# Patient Record
Sex: FEMALE | Race: WHITE | NOT HISPANIC OR LATINO | Employment: OTHER | URBAN - NONMETROPOLITAN AREA
[De-identification: names, ages, dates, MRNs, and addresses within clinical notes are randomized per-mention and may not be internally consistent; named-entity substitution may affect disease eponyms.]

---

## 2023-03-22 PROBLEM — E66.811 CLASS 1 OBESITY WITH BODY MASS INDEX (BMI) OF 32.0 TO 32.9 IN ADULT: Status: ACTIVE | Noted: 2023-03-22

## 2023-03-22 PROBLEM — E03.9 HYPOTHYROIDISM: Status: ACTIVE | Noted: 2023-03-22

## 2023-03-22 PROBLEM — E78.00 ELEVATED LDL CHOLESTEROL LEVEL: Status: ACTIVE | Noted: 2023-03-22

## 2023-03-22 PROBLEM — R03.0 ELEVATED BLOOD PRESSURE READING WITHOUT DIAGNOSIS OF HYPERTENSION: Status: ACTIVE | Noted: 2023-03-22

## 2023-03-22 PROBLEM — R01.1 HEART MURMUR: Status: ACTIVE | Noted: 2023-03-22

## 2023-03-22 PROBLEM — E66.9 CLASS 1 OBESITY WITH BODY MASS INDEX (BMI) OF 32.0 TO 32.9 IN ADULT: Status: ACTIVE | Noted: 2023-03-22

## 2023-03-22 RX ORDER — LEVOTHYROXINE SODIUM 112 UG/1
1 TABLET ORAL DAILY
COMMUNITY
End: 2024-01-08 | Stop reason: SDUPTHER

## 2023-03-23 ENCOUNTER — OFFICE VISIT (OUTPATIENT)
Dept: PRIMARY CARE | Facility: CLINIC | Age: 63
End: 2023-03-23
Payer: COMMERCIAL

## 2023-03-23 VITALS
OXYGEN SATURATION: 97 % | WEIGHT: 190.3 LBS | HEART RATE: 120 BPM | TEMPERATURE: 97.1 F | RESPIRATION RATE: 14 BRPM | HEIGHT: 67 IN | BODY MASS INDEX: 29.87 KG/M2 | SYSTOLIC BLOOD PRESSURE: 156 MMHG | DIASTOLIC BLOOD PRESSURE: 80 MMHG

## 2023-03-23 DIAGNOSIS — Z00.00 HEALTHCARE MAINTENANCE: ICD-10-CM

## 2023-03-23 DIAGNOSIS — E03.9 HYPOTHYROIDISM, UNSPECIFIED TYPE: ICD-10-CM

## 2023-03-23 DIAGNOSIS — H10.33 ACUTE CONJUNCTIVITIS OF BOTH EYES, UNSPECIFIED ACUTE CONJUNCTIVITIS TYPE: Primary | ICD-10-CM

## 2023-03-23 DIAGNOSIS — R03.0 ELEVATED BLOOD PRESSURE READING WITHOUT DIAGNOSIS OF HYPERTENSION: ICD-10-CM

## 2023-03-23 PROBLEM — E66.811 CLASS 1 OBESITY WITH BODY MASS INDEX (BMI) OF 32.0 TO 32.9 IN ADULT: Status: RESOLVED | Noted: 2023-03-22 | Resolved: 2023-03-23

## 2023-03-23 PROBLEM — E66.9 CLASS 1 OBESITY WITH BODY MASS INDEX (BMI) OF 32.0 TO 32.9 IN ADULT: Status: RESOLVED | Noted: 2023-03-22 | Resolved: 2023-03-23

## 2023-03-23 PROCEDURE — 99213 OFFICE O/P EST LOW 20 MIN: CPT | Performed by: FAMILY MEDICINE

## 2023-03-23 PROCEDURE — 1036F TOBACCO NON-USER: CPT | Performed by: FAMILY MEDICINE

## 2023-03-23 RX ORDER — NEOMYCIN AND POLYMYXIN B SULFATES AND BACITRACIN ZINC 400; 3.5; 1 [USP'U]/G; MG/G; [USP'U]/G
0.5 OINTMENT OPHTHALMIC 4 TIMES DAILY
Qty: 3.5 G | Refills: 1 | Status: SHIPPED | OUTPATIENT
Start: 2023-03-23 | End: 2023-04-02

## 2023-03-23 ASSESSMENT — ENCOUNTER SYMPTOMS
CHILLS: 0
FEVER: 0
WHEEZING: 0
RHINORRHEA: 0
WEIGHT LOSS: 0
PALPITATIONS: 0
SWEATS: 0
MYALGIAS: 0
HEMOPTYSIS: 0
SORE THROAT: 0
HEADACHES: 0
HEARTBURN: 0
SHORTNESS OF BREATH: 0
COUGH: 1

## 2023-03-23 NOTE — PROGRESS NOTES
"Subjective   Patient ID: Ofelia Morton is a 62 y.o. female who presents for URI (Cough,reddened eyes/eye drainage for appox 9 days did have low grade fever and chills the first day).    Cough  This is a new problem. Episode onset: 9 days ago. The problem has been gradually worsening. The problem occurs every few minutes. The cough is Non-productive. Associated symptoms include ear congestion. Pertinent negatives include no chest pain, chills, fever, heartburn, hemoptysis, myalgias, nasal congestion, postnasal drip, rash, shortness of breath, sweats or weight loss. Nothing aggravates the symptoms.     Her symptoms started after [place] zoo. She has been using warm compresses to eyes, conjunctival are injected. Now having gunky discharge from eyes, has to every 5-6 hours.    Fever initially over a week ago, none recent.  Took Tylenol for this.  Felt unwell until a few days ago.   Feeling better today, more so than yesterday.    She has congestion but is unable to blow anything out of her nose. No colored secretions. No headaches.  Ear pain transiently for about 2 hours, none since.  Main complaints are of eye discharge and eye redness.    No chest pain, SOB, palpitations.  No myalgias, swollen glands, rash.  No change in vision, no eye pain, no pain with extra occular movements.    Review of Systems   Constitutional:  Negative for chills, fever and weight loss.   HENT:  Positive for congestion. Negative for postnasal drip.    Respiratory:  Negative for hemoptysis and shortness of breath.    Cardiovascular:  Negative for chest pain and palpitations.   Gastrointestinal:  Negative for heartburn.   Musculoskeletal:  Negative for myalgias.   Skin:  Negative for rash.       Objective   /80 (BP Location: Left arm, Patient Position: Sitting, BP Cuff Size: Adult)   Pulse (!) 120   Temp 36.2 °C (97.1 °F) (Temporal)   Resp 14   Ht 1.689 m (5' 6.5\")   Wt 86.3 kg (190 lb 4.8 oz)   SpO2 97%   BMI 30.26 kg/m² "     Physical Exam  Gen: Patient appears non-toxic and in NAD  Skin: No rashes  Head/eyes: MEE, EOMI, significant conjunctival injection bilaterally. Bulbar and palpebral conjunctiva bilaterally.  Ears: Right TM with air fluid level positive, non-purulent. Left TM normal. Right TM slightly more retracted than left, no bulging or erythema to either.  Nose: MMP, clear post-nasal drainage.  Throat: MMP with no exudates, uvula midline  Neck: No cervical lymphadenopathy, no masses, no JVD  CV: HRRR no MCG  Lungs: CTA no wheeze, rhonchi, rales  Extremities: no pretibial edema    Assessment/Plan   Diagnoses and all orders for this visit:  Acute conjunctivitis of both eyes, unspecified acute conjunctivitis type  -     neomycin-bacitracin-polymyxin (Polysporin) ophthalmic ointment; Apply 0.5 inches to both eyes in the morning and 0.5 inches at noon and 0.5 inches in the evening and 0.5 inches before bedtime. Do all this for 10 days.  Elevated blood pressure reading without diagnosis of hypertension  Hypothyroidism, unspecified type  -     TSH with reflex to Free T4 if abnormal; Future  Healthcare maintenance  -     CBC; Future  -     Comprehensive Metabolic Panel; Future  -     Lipid Panel; Future  -     Follow Up In [facility] - PCP; Future    Patient with primarily with eye complaints, no recent fever or colored sputum. Likely viral, reviewed with patient, however, due to duration will give eye drop.    POLYSPORIN eye drops sent to pharmacy, proper use discussed with patient.    Routine blood work ordered for physical today.  Will recheck BP and further discuss at follow-up.    Follow-up in 3 months for recheck blood pressure and annual physical. Blood work to be done prior.  Call for sooner follow-up if needed.        Scribe Attestation  By signing my name below, Renee ROBIN Scribe   attest that this documentation has been prepared under the direction and in the presence of Sally Marley DO.

## 2023-03-23 NOTE — PATIENT INSTRUCTIONS
EYE DROPS SENT TO YOUR PHARMACY  USE EYE DROPS AS DISCUSSED UNTIL SYMPTOMS CLEAR THEN FOR 1-2 ADDITIONAL DAYS AFTER    Blood work for physical ordered today, having this done FASTING at your earliest convenience.  Follow-up in 3 months for routine visit plus annual physical.   patient/guardian

## 2023-07-25 ENCOUNTER — APPOINTMENT (OUTPATIENT)
Dept: PRIMARY CARE | Facility: CLINIC | Age: 63
End: 2023-07-25
Payer: COMMERCIAL

## 2023-11-13 ENCOUNTER — PATIENT OUTREACH (OUTPATIENT)
Dept: CARE COORDINATION | Facility: CLINIC | Age: 63
End: 2023-11-13
Payer: COMMERCIAL

## 2023-11-13 ENCOUNTER — DOCUMENTATION (OUTPATIENT)
Dept: PRIMARY CARE | Facility: CLINIC | Age: 63
End: 2023-11-13
Payer: COMMERCIAL

## 2023-11-13 RX ORDER — DAPAGLIFLOZIN 10 MG/1
10 TABLET, FILM COATED ORAL
COMMUNITY
Start: 2023-11-11 | End: 2024-02-09

## 2023-11-13 RX ORDER — METOPROLOL SUCCINATE 25 MG/1
25 TABLET, EXTENDED RELEASE ORAL
COMMUNITY
Start: 2023-11-10 | End: 2024-03-09

## 2023-11-13 RX ORDER — ASPIRIN 81 MG/1
81 TABLET ORAL
COMMUNITY
Start: 2023-11-11 | End: 2024-11-05

## 2023-11-13 RX ORDER — NITROGLYCERIN 0.4 MG/1
0.4 TABLET SUBLINGUAL
COMMUNITY
Start: 2023-11-10 | End: 2023-12-10

## 2023-11-13 RX ORDER — ROSUVASTATIN CALCIUM 40 MG/1
1 TABLET, COATED ORAL NIGHTLY
COMMUNITY
Start: 2023-11-10 | End: 2024-03-09

## 2023-11-13 NOTE — PROGRESS NOTES
Discharge Facility: [facility]  Discharge Diagnosis: STEMI  Admission Date: 11/9/23  Discharge Date: 11/10/23    PCP Appointment Date: 11/21/23  Specialist Appointment Date: TBD cardiology  Hospital Encounter and Summary: Linked   See discharge assessment below for further details    Engagement  Call Start Time: 1215 (11/13/2023 12:38 PM)    Medications  Medications reviewed with patient/caregiver?: Yes (11/13/2023 12:38 PM)  Is the patient having any side effects they believe may be caused by any medication additions or changes?: No (11/13/2023 12:38 PM)  Does the patient have all medications ordered at discharge?: Yes (11/13/2023 12:38 PM)  Care Management Interventions: Provided patient education (11/13/2023 12:38 PM)  Prescription Comments: on aspirin, brilinta, crestor, metoprolol, farxiga, nitro as needed (11/13/2023 12:38 PM)  Is the patient taking all medications as directed (includes completed medication regime)?: Yes (11/13/2023 12:38 PM)  Care Management Interventions: Provided patient education (11/13/2023 12:38 PM)  Medication Comments: No issues obtaining or affording medication (11/13/2023 12:38 PM)    Appointments  Does the patient have a primary care provider?: Yes (11/13/2023 12:38 PM)  Care Management Interventions: Verified appointment date/time/provider (11/13/2023 12:38 PM)  Has the patient kept scheduled appointments due by today?: Yes (11/13/2023 12:38 PM)  Care Management Interventions: Advised to schedule with specialist (11/13/2023 12:38 PM)    Patient Teaching  Does the patient have access to their discharge instructions?: Yes (11/13/2023 12:38 PM)  Care Management Interventions: Reviewed instructions with patient (11/13/2023 12:38 PM)  What is the patient's perception of their health status since discharge?: Improving (11/13/2023 12:38 PM)  Is the patient/caregiver able to teach back the hierarchy of who to call/visit for symptoms/problems? PCP, Specialist, Home Health nurse, Urgent Care,  "ED, 911: Yes (11/13/2023 12:38 PM)  Patient/Caregiver Education Comments: Aware to seek care with any worsening and prolonged chest pain, weigh self daily and record, adhere to low sodium heart healthy diet, compliance with medications. (11/13/2023 12:38 PM)    Wrap Up  Wrap Up Additional Comments: She is doing well, reported a couple instances of \"twinge\" sensation in her chest, resolved immediately, no pain. Advised possibly due to stent placement and settling into vessle, spasms sometimes occur but to notify cardiology of any increase in instances or pain. (11/13/2023 12:38 PM)  Call End Time: 1225 (11/13/2023 12:38 PM)        " Toxic myopathy

## 2023-11-21 ENCOUNTER — OFFICE VISIT (OUTPATIENT)
Dept: PRIMARY CARE | Facility: CLINIC | Age: 63
End: 2023-11-21
Payer: COMMERCIAL

## 2023-11-21 VITALS
HEART RATE: 99 BPM | DIASTOLIC BLOOD PRESSURE: 81 MMHG | WEIGHT: 180.8 LBS | SYSTOLIC BLOOD PRESSURE: 131 MMHG | TEMPERATURE: 98.8 F | OXYGEN SATURATION: 94 % | BODY MASS INDEX: 28.74 KG/M2 | RESPIRATION RATE: 14 BRPM

## 2023-11-21 DIAGNOSIS — I50.20 HFREF (HEART FAILURE WITH REDUCED EJECTION FRACTION) (MULTI): ICD-10-CM

## 2023-11-21 DIAGNOSIS — I10 PRIMARY HYPERTENSION: ICD-10-CM

## 2023-11-21 DIAGNOSIS — Z09 HOSPITAL DISCHARGE FOLLOW-UP: ICD-10-CM

## 2023-11-21 DIAGNOSIS — E78.49 OTHER HYPERLIPIDEMIA: ICD-10-CM

## 2023-11-21 DIAGNOSIS — I21.4 NSTEMI (NON-ST ELEVATED MYOCARDIAL INFARCTION) (MULTI): Primary | ICD-10-CM

## 2023-11-21 PROCEDURE — 3079F DIAST BP 80-89 MM HG: CPT | Performed by: FAMILY MEDICINE

## 2023-11-21 PROCEDURE — 99495 TRANSJ CARE MGMT MOD F2F 14D: CPT | Performed by: FAMILY MEDICINE

## 2023-11-21 PROCEDURE — 1036F TOBACCO NON-USER: CPT | Performed by: FAMILY MEDICINE

## 2023-11-21 PROCEDURE — 3075F SYST BP GE 130 - 139MM HG: CPT | Performed by: FAMILY MEDICINE

## 2023-11-21 NOTE — ASSESSMENT & PLAN NOTE
Ef 40 -45% after mi     On GD therapy   minus ARB,   bp too low to start   will defer addition valsartan to cardio     Recent stent now stable no sx, follows with cardio

## 2023-11-21 NOTE — ASSESSMENT & PLAN NOTE
Lad stent [facility] [place] nov 2023    Gdt started     Ef 40 -45% after mi     On GD therapy   minus ARB,   bp too low to start   will defer addition valsartan to cardio     Recent stent now stable no sx, follows with cardio

## 2023-11-21 NOTE — PROGRESS NOTES
Subjective   Patient ID: Ofelia Morton is a 63 y.o. female who presents for Hospital Follow-up (Pt presents for follow up hospital- STEMI, pt states that she is feeling great. Pt states no other concerns at this time. ) and Flu Vaccine (Pt declines flu vaccine at this time. ).    HPI     Patient is here for a hospital follow-up.  She was admitted to [facility] with an NSTEMI.  LAD was stented.  EF was found to be between 40 and 45%.    Patient does have follow-up upcoming with cardiology [facility] in December.  They will start her on cardiac rehab at that time.    Since discharge, patient has had no chest pain, palpitations, shortness of breath, nor has she noticed a difference in her exercise capacity.    Patient is not pushing exercise at this point, but has walked around and has even done a couple of short walks with the dog and has not found herself to be short of breath.    Leading up to her admission she had significant chest pressure and emesis x2 which led her to her ER evaluation.    Patient met with a nutritionist, and has already made changes to her lifestyle.    Patient is using this as a wake-up call.    She is taking and tolerating her medications.  She has not noticed a significant amount of increased urination with the fark Lucia.  Farxiga was started for heart failure, not for diabetes.    Patient is in the lowest prediabetic range by lab at her most recent admission.    She denies any yeast infection symptoms.    She does admit to a slight cough, but no other symptoms.    She has a good support system in her .    She is able to tolerate and afford her medications.    She does note that she has sort of a hollow feeling around her heart, not sure if that is because of her increased perfusion and lack of strain of her heart, or if it is a side effect of one of the medications such as Brilinta.    Patient does not have a blood pressure cuff, but plans to purchase  1.    Review of Systems    Objective   /81 (BP Location: Left arm, Patient Position: Sitting, BP Cuff Size: Large adult)   Pulse 99   Temp 37.1 °C (98.8 °F) (Temporal)   Resp 14   Wt 82 kg (180 lb 12.8 oz)   SpO2 94%   BMI 28.74 kg/m²     Physical Exam  Constitutional:       General: She is not in acute distress.     Appearance: Normal appearance.   Cardiovascular:      Rate and Rhythm: Normal rate and regular rhythm.      Heart sounds: Normal heart sounds.      Comments: Trace pretib edema bilaterally     No jvd  no hjr  Pulmonary:      Effort: Pulmonary effort is normal.      Breath sounds: Normal breath sounds. No wheezing, rhonchi or rales.   Abdominal:      Palpations: Abdomen is soft.      Tenderness: There is no abdominal tenderness. There is no guarding or rebound.   Neurological:      Mental Status: She is alert.   Psychiatric:         Mood and Affect: Mood normal.         Assessment/Plan   Problem List Items Addressed This Visit             ICD-10-CM    Primary hypertension I10     On bbl s/p mi     Will add arb if bp allows for hf tx    deferred today         NSTEMI (non-ST elevated myocardial infarction) (CMS/[facility]) - Primary I21.4     Lad stent [facility] [place] nov 2023    Gdt started     Ef 40 -45% after mi     On GD therapy   minus ARB,   bp too low to start   will defer addition valsartan to cardio     Recent stent now stable no sx, follows with cardio          Other hyperlipidemia E78.49     On statin         HFrEF (heart failure with reduced ejection fraction) (CMS/[facility]) I50.20     Ef 40 -45% after mi     On GD therapy   minus ARB,   bp too low to start   will defer addition valsartan to cardio     Recent stent now stable no sx, follows with cardio          Hospital discharge follow-up Z09     See notes, doing well after nstemi,  stenting lad           Follow up in 6 mo w me  recheck bp, lifestyle changes, thyroid

## 2023-11-28 ENCOUNTER — PATIENT OUTREACH (OUTPATIENT)
Dept: CARE COORDINATION | Facility: CLINIC | Age: 63
End: 2023-11-28
Payer: COMMERCIAL

## 2023-12-28 ENCOUNTER — PATIENT OUTREACH (OUTPATIENT)
Dept: CARE COORDINATION | Facility: CLINIC | Age: 63
End: 2023-12-28
Payer: COMMERCIAL

## 2024-01-08 DIAGNOSIS — E03.9 HYPOTHYROIDISM, UNSPECIFIED TYPE: ICD-10-CM

## 2024-01-08 RX ORDER — LEVOTHYROXINE SODIUM 112 UG/1
112 TABLET ORAL DAILY
Qty: 90 TABLET | Refills: 3 | Status: SHIPPED | OUTPATIENT
Start: 2024-01-08

## 2024-02-01 ENCOUNTER — PATIENT OUTREACH (OUTPATIENT)
Dept: CARE COORDINATION | Facility: CLINIC | Age: 64
End: 2024-02-01
Payer: COMMERCIAL

## 2024-05-21 ENCOUNTER — APPOINTMENT (OUTPATIENT)
Dept: PRIMARY CARE | Facility: CLINIC | Age: 64
End: 2024-05-21

## 2024-07-10 ENCOUNTER — OFFICE VISIT (OUTPATIENT)
Dept: PRIMARY CARE | Facility: CLINIC | Age: 64
End: 2024-07-10
Payer: COMMERCIAL

## 2024-07-10 VITALS
TEMPERATURE: 97.9 F | OXYGEN SATURATION: 96 % | SYSTOLIC BLOOD PRESSURE: 143 MMHG | BODY MASS INDEX: 28.68 KG/M2 | DIASTOLIC BLOOD PRESSURE: 78 MMHG | WEIGHT: 180.4 LBS | RESPIRATION RATE: 14 BRPM | HEART RATE: 90 BPM

## 2024-07-10 DIAGNOSIS — R35.0 URINARY FREQUENCY: ICD-10-CM

## 2024-07-10 DIAGNOSIS — R30.0 DYSURIA: Primary | ICD-10-CM

## 2024-07-10 PROCEDURE — 87086 URINE CULTURE/COLONY COUNT: CPT

## 2024-07-10 PROCEDURE — 3077F SYST BP >= 140 MM HG: CPT | Performed by: FAMILY MEDICINE

## 2024-07-10 PROCEDURE — 3078F DIAST BP <80 MM HG: CPT | Performed by: FAMILY MEDICINE

## 2024-07-10 PROCEDURE — 81001 URINALYSIS AUTO W/SCOPE: CPT

## 2024-07-10 PROCEDURE — 99214 OFFICE O/P EST MOD 30 MIN: CPT | Performed by: FAMILY MEDICINE

## 2024-07-10 PROCEDURE — 1036F TOBACCO NON-USER: CPT | Performed by: FAMILY MEDICINE

## 2024-07-10 RX ORDER — LISINOPRIL 5 MG/1
5 TABLET ORAL
COMMUNITY
Start: 2023-12-04

## 2024-07-10 RX ORDER — DAPAGLIFLOZIN 10 MG/1
1 TABLET, FILM COATED ORAL
COMMUNITY
Start: 2024-05-28

## 2024-07-10 RX ORDER — NITROFURANTOIN 25; 75 MG/1; MG/1
100 CAPSULE ORAL 2 TIMES DAILY
Qty: 14 CAPSULE | Refills: 0 | Status: SHIPPED | OUTPATIENT
Start: 2024-07-10 | End: 2024-07-17

## 2024-07-10 RX ORDER — ROSUVASTATIN CALCIUM 40 MG/1
40 TABLET, COATED ORAL NIGHTLY
COMMUNITY
Start: 2024-05-28

## 2024-07-10 NOTE — PROGRESS NOTES
Subjective        Ofelia Morton is a 63 y.o. female who presents for      HPI:    Dr Marley pt       Chief Complaint   Patient presents with    Blood in Urine     Last night + urinary urgency, mild abdominal pain and chills  No hematuria  observed since 10 am today.  Pt denies fever and flank pain   Pt has not taken anything to alleviate sxs, nothing exacerbates sxs  Pt has not been treated elsewhere for these sxs     Pt does deny any vaginal bleeding - although this was on the note line when she scheduled     Who is GYN? None      When was last pap? Unknown        What is pt taking ASA for? Diagnosis?  Myocardial infarction 11/09/2023-heart cath and stent    Is pt taking Brilinta also? Yes    Small amount blood on TP and some in toilet bowl last night when urinated, none since           4/4/24- H/H=14.5/46.1      No history kidney stone    Not dizzy or lightheaded     Social History     Tobacco Use   Smoking Status Never    Passive exposure: Never   Smokeless Tobacco Never         Review of Systems:        Objective        Vitals:    07/10/24 1417   BP: 143/78   BP Location: Left arm   Patient Position: Sitting   BP Cuff Size: Adult   Pulse: 90   Resp: 14   Temp: 36.6 °C (97.9 °F)   SpO2: 96%   Weight: 81.8 kg (180 lb 6.4 oz)       Pt is A and O x3, NAD, nontoxic, well-hydrated   Head- normocephalic and atraumatic,   EYES- conjunctiva- normal   lids- normal  EARS/NOSE- normal external exam   CV- RRR without murmur  PULM- CTA bilaterally, normal respiratory effort  RESPIRATORY EFFORT- normal , no retractions or nasal flaring   ABD- normoactive BS's , soft, no HSM, no CVAT, NT   EXT- no edema,NT  SKIN- no abnormal skin lesions noted  NEURO- no focal deficits  PSYCH- pleasant, normal judgement and insight                    BP Readings from Last 3 Encounters:   07/10/24 143/78   11/21/23 131/81   03/23/23 156/80           Wt Readings from Last 3 Encounters:   07/10/24 81.8 kg (180 lb 6.4 oz)   11/21/23 82 kg  (180 lb 12.8 oz)   03/23/23 86.3 kg (190 lb 4.8 oz)         BMI Readings from Last 3 Encounters:   07/10/24 28.68 kg/m²   11/21/23 28.74 kg/m²   03/23/23 30.26 kg/m²     The number and complexity of problems addressed is considered moderate.  The amount and/or complexity of data reviewed and analyzed is considered moderate. The risk of complications and/or morbidity/mortality of patient is considered moderate. Overall, this patient encounter is considered a moderate risk visit.      What are antibiotics?  Antibiotics are medicines that help people fight infections caused by bacteria. They work by killing bacteria that are in the body. These medicines come in many different forms, including pills, ointments, and liquids that are given by injection.    Antibiotics can do a lot of good. For people with serious bacterial infections, antibiotics can save lives. But people use them far too often, even when they're not needed. This is causing a very serious problem called antibiotic resistance. Antibiotic resistance happens when bacteria that have been exposed to an antibiotic change so that the antibiotic can no longer kill them. In those bacteria, the antibiotic has no effect. Because of this problem, doctors are having a harder and harder time treating infections. Experts worry that there will soon be infections that don't respond to any antibiotics.    When are antibiotics helpful?  Antibiotics can help people fight off infections caused by bacteria. They do not work on infections caused by viruses.    Some common bacterial infections that are treated with antibiotics include:    Strep throat    Pneumonia (an infection of the lungs)    Bladder infections    Infections you catch through sex, such as gonorrhea and chlamydia    When are antibiotics NOT helpful?    Antibiotics do not work on infections caused by viruses.    Antibiotics are not helpful for the common cold, because the common cold is caused by a  virus.    Antibiotics are not helpful for the flu, because the flu is caused by a virus. (People with the flu can be treated with medicines called antiviral medicines, which are different from antibiotics.)      Even though antibiotics don't work on infections caused by viruses, people sometimes believe that they do. That's because they took antibiotics for a viral infection before and then got better. The problem is that those people would have gotten better with or without an antibiotic. When they get better with the antibiotic, they think that's what cured them, when in reality the antibiotic had nothing to do with it.    What's the harm in taking antibiotics even if they might not help?  There are many reasons you should not take antibiotics unless you absolutely need them:    Antibiotics cause side effects, such as nausea, vomiting, and diarrhea. They can even make it more likely that you will get a different kind of infection, such as yeast infection (if you are a woman).    Allergies to antibiotics are common. You can develop an allergy to an antibiotic, even if you have not had a problem with it before. Some allergies are just unpleasant, causing rashes and itching. But some can be very serious and even life-threatening. It is better to avoid any risk of an allergy, if the antibiotic wouldn't help you anyway.    Overuse of antibiotics leads to antibiotic resistance. Using antibiotics when they are not needed gives bacteria a chance to change, so that the antibiotics cannot hurt them later on. People who have infections caused by antibiotic-resistant bacteria often have to be treated in the hospital with many different antibiotics. People can even die from these infections, because there is no antibiotic that will cure them.    When should I take antibiotics?  You should take antibiotics only when a doctor or nurse prescribes them to you. You should never take antibiotics prescribed to someone else, and you  "should not take antibiotics that were prescribed to you for a previous illness. When prescribing an antibiotic, doctors and nurses have to carefully pick the right antibiotic for a particular infection. Not all antibiotics work on all bacteria.    If you do have an infection caused by a bacteria, your doctor or nurse might want to find out what that bacteria is, and which antibiotics can kill it. They do this by taking a \"culture\" of the bacteria and growing it in the lab. But it's not possible to do a culture on someone who has already started an antibiotic. So if you start an antibiotic without input from a doctor or nurse it will be impossible to know if you have taken the right one.    What can I do to reduce antibiotic resistance?  Here are some things that can help:    Do not pressure your doctor or nurse for antibiotics when they do not think you need them.    If you are prescribed antibiotics, finish all of the medicine and take it exactly as directed. Never skip doses or stop taking the medicine without talking to your doctor or nurse.    Do not give antibiotics that were prescribed to you to anyone else.    What if my doctor prescribes an antibiotic that did not work for me before?  If an antibiotic did not work for you before, that does not mean it will never work for you. If you have used an antibiotic before and it did not work, tell your doctor. But keep in mind that the infection you had before might not have been caused by the same bacteria that you have now. The \"best\" antibiotic is the right one for the bacteria causing the infection, not for the person with the infection.    What if I am allergic to an antibiotic?  If you had a bad reaction to an antibiotic, tell your doctor or nurse about it. But do not assume you are allergic unless your doctor or nurse tells you that you are.    Many people who think they are allergic to an antibiotic are wrong. If you get nauseous after taking an antibiotic, " that does not mean you are allergic to it. Nausea is a common side effect of many antibiotics. If you are a woman and you get a yeast infection after taking an antibiotic, that does not mean you are allergic to it. Yeast infections are a common side effect of antibiotics.    Symptoms of antibiotic allergy can be mild and include a flat rash and itching. They can also be more serious and include:    Hives - Hives are raised, red patches of skin that are usually very itchy (picture 1).    Lip or tongue swelling    Trouble swallowing or breathing    Serious allergy symptoms can start right after you start taking an antibiotic if you are very sensitive. Less serious symptoms, on the other hand, often start a day or more later.    Almost all antibiotics may cause possible side effects of allergic reaction, nausea, vomiting, diarrhea- most worrisome caused by C. diff, and secondary yeast infection.      Assessment/Plan      1. Dysuria        2. Urinary frequency            No orders of the defined types were placed in this encounter.    Increase fluids       Start macrobid             Call if no better or if symptoms worsen- go to ED for increased bleeding, pain, fever , vomiting dizzy/lightheaded        Calm

## 2024-07-11 LAB
APPEARANCE UR: ABNORMAL
BILIRUB UR STRIP.AUTO-MCNC: NEGATIVE MG/DL
COLOR UR: ABNORMAL
GLUCOSE UR STRIP.AUTO-MCNC: ABNORMAL MG/DL
KETONES UR STRIP.AUTO-MCNC: NEGATIVE MG/DL
LEUKOCYTE ESTERASE UR QL STRIP.AUTO: NEGATIVE
MIXED CELL CASTS #/AREA UR COMP ASSIST: ABNORMAL /LPF
MUCOUS THREADS #/AREA URNS AUTO: ABNORMAL /LPF
NITRITE UR QL STRIP.AUTO: NEGATIVE
PH UR STRIP.AUTO: 5.5 [PH]
PROT UR STRIP.AUTO-MCNC: ABNORMAL MG/DL
RBC # UR STRIP.AUTO: ABNORMAL /UL
RBC #/AREA URNS AUTO: >20 /HPF
SP GR UR STRIP.AUTO: 1.02
SQUAMOUS #/AREA URNS AUTO: ABNORMAL /HPF
UROBILINOGEN UR STRIP.AUTO-MCNC: NORMAL MG/DL
WBC #/AREA URNS AUTO: ABNORMAL /HPF

## 2024-07-12 DIAGNOSIS — R81 GLUCOSE FOUND IN URINE ON EXAMINATION: Primary | Chronic | ICD-10-CM

## 2024-07-12 DIAGNOSIS — R31.9 HEMATURIA, UNSPECIFIED TYPE: ICD-10-CM

## 2024-07-12 LAB — BACTERIA UR CULT: NORMAL

## 2024-07-13 ENCOUNTER — TELEPHONE (OUTPATIENT)
Dept: PRIMARY CARE | Facility: CLINIC | Age: 64
End: 2024-07-13
Payer: COMMERCIAL

## 2024-07-24 PROBLEM — I25.10 CORONARY ARTERY DISEASE INVOLVING NATIVE CORONARY ARTERY OF NATIVE HEART WITHOUT ANGINA PECTORIS: Status: ACTIVE | Noted: 2024-06-06

## 2024-07-24 PROBLEM — I25.5 ISCHEMIC CARDIOMYOPATHY: Status: ACTIVE | Noted: 2023-11-09

## 2024-07-25 ENCOUNTER — LAB (OUTPATIENT)
Dept: LAB | Facility: LAB | Age: 64
End: 2024-07-25
Payer: COMMERCIAL

## 2024-07-25 DIAGNOSIS — R30.0 DYSURIA: ICD-10-CM

## 2024-07-25 DIAGNOSIS — R81 GLUCOSE FOUND IN URINE ON EXAMINATION: ICD-10-CM

## 2024-07-25 DIAGNOSIS — I10 HYPERTENSION, ESSENTIAL, BENIGN: ICD-10-CM

## 2024-07-25 PROCEDURE — 80053 COMPREHEN METABOLIC PANEL: CPT

## 2024-07-25 PROCEDURE — 36415 COLL VENOUS BLD VENIPUNCTURE: CPT

## 2024-07-25 PROCEDURE — 81001 URINALYSIS AUTO W/SCOPE: CPT

## 2024-07-25 PROCEDURE — 85027 COMPLETE CBC AUTOMATED: CPT

## 2024-07-25 PROCEDURE — 84443 ASSAY THYROID STIM HORMONE: CPT

## 2024-07-26 LAB
ALBUMIN SERPL BCP-MCNC: 4.5 G/DL (ref 3.4–5)
ALP SERPL-CCNC: 88 U/L (ref 33–136)
ALT SERPL W P-5'-P-CCNC: 22 U/L (ref 7–45)
ANION GAP SERPL CALC-SCNC: 17 MMOL/L (ref 10–20)
APPEARANCE UR: CLEAR
AST SERPL W P-5'-P-CCNC: 23 U/L (ref 9–39)
BILIRUB SERPL-MCNC: 0.5 MG/DL (ref 0–1.2)
BILIRUB UR STRIP.AUTO-MCNC: NEGATIVE MG/DL
BUN SERPL-MCNC: 20 MG/DL (ref 6–23)
CALCIUM SERPL-MCNC: 9.5 MG/DL (ref 8.6–10.6)
CHLORIDE SERPL-SCNC: 99 MMOL/L (ref 98–107)
CO2 SERPL-SCNC: 24 MMOL/L (ref 21–32)
COLOR UR: ABNORMAL
CREAT SERPL-MCNC: 1.03 MG/DL (ref 0.5–1.05)
EGFRCR SERPLBLD CKD-EPI 2021: 62 ML/MIN/1.73M*2
ERYTHROCYTE [DISTWIDTH] IN BLOOD BY AUTOMATED COUNT: 13.1 % (ref 11.5–14.5)
GLUCOSE SERPL-MCNC: 103 MG/DL (ref 74–99)
GLUCOSE UR STRIP.AUTO-MCNC: ABNORMAL MG/DL
HCT VFR BLD AUTO: 43.9 % (ref 36–46)
HGB BLD-MCNC: 13.9 G/DL (ref 12–16)
KETONES UR STRIP.AUTO-MCNC: NEGATIVE MG/DL
LEUKOCYTE ESTERASE UR QL STRIP.AUTO: NEGATIVE
MCH RBC QN AUTO: 30.3 PG (ref 26–34)
MCHC RBC AUTO-ENTMCNC: 31.7 G/DL (ref 32–36)
MCV RBC AUTO: 96 FL (ref 80–100)
NITRITE UR QL STRIP.AUTO: NEGATIVE
NRBC BLD-RTO: 0 /100 WBCS (ref 0–0)
PH UR STRIP.AUTO: 5.5 [PH]
PLATELET # BLD AUTO: 321 X10*3/UL (ref 150–450)
POTASSIUM SERPL-SCNC: 4.4 MMOL/L (ref 3.5–5.3)
PROT SERPL-MCNC: 7.6 G/DL (ref 6.4–8.2)
PROT UR STRIP.AUTO-MCNC: ABNORMAL MG/DL
RBC # BLD AUTO: 4.59 X10*6/UL (ref 4–5.2)
RBC # UR STRIP.AUTO: ABNORMAL /UL
RBC #/AREA URNS AUTO: NORMAL /HPF
SODIUM SERPL-SCNC: 136 MMOL/L (ref 136–145)
SP GR UR STRIP.AUTO: 1.01
UROBILINOGEN UR STRIP.AUTO-MCNC: NORMAL MG/DL
WBC # BLD AUTO: 7.9 X10*3/UL (ref 4.4–11.3)
WBC #/AREA URNS AUTO: NORMAL /HPF

## 2024-07-29 ENCOUNTER — APPOINTMENT (OUTPATIENT)
Dept: PRIMARY CARE | Facility: CLINIC | Age: 64
End: 2024-07-29
Payer: COMMERCIAL

## 2024-07-29 VITALS
DIASTOLIC BLOOD PRESSURE: 68 MMHG | HEART RATE: 98 BPM | TEMPERATURE: 96.8 F | SYSTOLIC BLOOD PRESSURE: 127 MMHG | WEIGHT: 181.9 LBS | BODY MASS INDEX: 28.92 KG/M2 | OXYGEN SATURATION: 98 %

## 2024-07-29 DIAGNOSIS — I25.10 CORONARY ARTERY DISEASE INVOLVING NATIVE CORONARY ARTERY OF NATIVE HEART WITHOUT ANGINA PECTORIS: ICD-10-CM

## 2024-07-29 DIAGNOSIS — R81 GLUCOSE FOUND IN URINE ON EXAMINATION: Chronic | ICD-10-CM

## 2024-07-29 DIAGNOSIS — I10 PRIMARY HYPERTENSION: Primary | ICD-10-CM

## 2024-07-29 DIAGNOSIS — E78.49 OTHER HYPERLIPIDEMIA: ICD-10-CM

## 2024-07-29 DIAGNOSIS — E03.9 HYPOTHYROIDISM, UNSPECIFIED TYPE: ICD-10-CM

## 2024-07-29 DIAGNOSIS — I25.5 ISCHEMIC CARDIOMYOPATHY: ICD-10-CM

## 2024-07-29 PROBLEM — I50.20 HFREF (HEART FAILURE WITH REDUCED EJECTION FRACTION) (MULTI): Status: RESOLVED | Noted: 2023-11-21 | Resolved: 2024-07-29

## 2024-07-29 PROBLEM — Z09 HOSPITAL DISCHARGE FOLLOW-UP: Status: RESOLVED | Noted: 2023-11-21 | Resolved: 2024-07-29

## 2024-07-29 LAB
POC HEMOGLOBIN A1C: 5.8 % (ref 4.2–6.5)
TSH SERPL-ACNC: 1.88 MIU/L (ref 0.44–3.98)

## 2024-07-29 PROCEDURE — 99213 OFFICE O/P EST LOW 20 MIN: CPT | Performed by: FAMILY MEDICINE

## 2024-07-29 PROCEDURE — 83036 HEMOGLOBIN GLYCOSYLATED A1C: CPT | Performed by: FAMILY MEDICINE

## 2024-07-29 PROCEDURE — 3074F SYST BP LT 130 MM HG: CPT | Performed by: FAMILY MEDICINE

## 2024-07-29 PROCEDURE — 3078F DIAST BP <80 MM HG: CPT | Performed by: FAMILY MEDICINE

## 2024-07-29 PROCEDURE — 1036F TOBACCO NON-USER: CPT | Performed by: FAMILY MEDICINE

## 2024-07-29 NOTE — ASSESSMENT & PLAN NOTE
S/p LAD stent 11/2023, taking rosuvastatin 40 mg daily  Presently on Brilinta  Follows with cardiology.    Per last visit with cardiologist Dr. Aurelio Delatorre on 6/7/2024:  She is tolerating Brilinta without significant side effects. Plan to continue current dose through November of this year. Likely change to Plavix or low-dose Brilinta next office visit     Asymptomatic from cardiac standpoint.  Continue statin therapy  Continue other cardiac medications as per cardio.    To lower this with lifestyle measures:  -make sure you are avoiding refined carbs such as breads, pasta, cereal, candy, soda,  nutrition bars, granola, chips, and sugar sweetened beverages.      -eat 5- 7 servings daily of veggies,  healthy protein such as chicken, fish,  beans, and eggs, and include healthy fats in your diet such as seeds, nuts, olive oil, avocados, and salmon.   -exercise 4 - 6 days per week as you are able, 150 minutes total weekly divided up is recommended with 3-4 of those days including resistance/strength training.  -consider taking the fiber product psyllium capsules or powder 2 times daily (generic brand is fine) , or a brand name psyllium such as [employer] [facility] [employer] or Metamucil.  -consider also adding plant stanols and sterols such as Nature Made CholestOff, available over the counter.

## 2024-07-29 NOTE — ASSESSMENT & PLAN NOTE
Stable, follows with cardiology, last saw Dr. Aurelio Delatorre on 6/7/2024  Improved on follow-up echocardiogram.  Currently maintained on Farxiga, lisinopril, and metoprolol  CCM as per cardio.

## 2024-07-29 NOTE — ASSESSMENT & PLAN NOTE
Recent UA noted 30+ glucose, IO A1c 5.8 today (prediabetic range)  Diet and exercise recommendations revisited.     INTERMITTENT FASTING  You may want to consider intermittent fasting.    There are several different approaches to this, but a straightforward and fairly easy one is to refrain from eating any calories for 12 hours every day. For example, from 8 PM until 8 AM, have nothing but water, or black coffee or tea in the morning. No cream or sugar should be used if you have coffee or tea. Make sure you have adequate fluid intake during this time, and throughout the day. This. A fast demands that your body pull upon stores of visceral fat for energy. This can help improve sugar readings, can improve cholesterol profiles, can decrease waist circumference, and can contribute to weight loss.    If after one month of 12 hour intermittent fasting, you feel comfortable increasing the hours, you may go for 14 hours, and even up to 16 hours. For example, stopped eating at 8 PM and don't eat again until noon the next day, breaking the fast at lunchtime. Snacking during the period of fast will break the fast and will not result in the same outcomes.    It is not recommended that you go beyond 16 hours of fasting.

## 2024-07-29 NOTE — ASSESSMENT & PLAN NOTE
On Synthroid therapy managed by PCP  Last TFTs: 11/2023 TSH normal    Due for repeat TSH, will add on to recently drawn blood work.  Continue present regimen, results to publish to portal, will notify patient if dose change is indicated.

## 2024-07-29 NOTE — PATIENT INSTRUCTIONS
Assessment/Plan      Primary hypertension - Primary I10     BP under good control today, continue present regimen.         Hypothyroidism E03.9     On Synthroid therapy managed by PCP  Last TFTs: 11/2023 TSH normal    Due for repeat TSH, will add on to recently drawn blood work.  Continue present regimen, results to publish to portal, will notify patient if dose change is indicated.         Relevant Orders    TSH with reflex to Free T4 if abnormal    Other hyperlipidemia E78.49     S/p LAD stent 11/2023, taking rosuvastatin 40 mg daily  Presently on Brilinta  Follows with cardiology.    Per last visit with cardiologist Dr. Aurelio Delatorre on 6/7/2024:  She is tolerating Brilinta without significant side effects. Plan to continue current dose through November of this year. Likely change to Plavix or low-dose Brilinta next office visit     Asymptomatic from cardiac standpoint.  Continue statin therapy  Continue other cardiac medications as per cardio.    To lower this with lifestyle measures:  -make sure you are avoiding refined carbs such as breads, pasta, cereal, candy, soda,  nutrition bars, granola, chips, and sugar sweetened beverages.      -eat 5- 7 servings daily of veggies,  healthy protein such as chicken, fish,  beans, and eggs, and include healthy fats in your diet such as seeds, nuts, olive oil, avocados, and salmon.   -exercise 4 - 6 days per week as you are able, 150 minutes total weekly divided up is recommended with 3-4 of those days including resistance/strength training.  -consider taking the fiber product psyllium capsules or powder 2 times daily (generic brand is fine) , or a brand name psyllium such as [employer] or Metamucil.  -consider also adding plant stanols and sterols such as Nature Made CholestOff, available over the counter.          Glucose found in urine on examination (Chronic) R81     Recent UA noted 30+ glucose, IO A1c 5.8 today (prediabetic range)  Diet and exercise  recommendations revisited.     INTERMITTENT FASTING  You may want to consider intermittent fasting.    There are several different approaches to this, but a straightforward and fairly easy one is to refrain from eating any calories for 12 hours every day. For example, from 8 PM until 8 AM, have nothing but water, or black coffee or tea in the morning. No cream or sugar should be used if you have coffee or tea. Make sure you have adequate fluid intake during this time, and throughout the day. This. A fast demands that your body pull upon stores of visceral fat for energy. This can help improve sugar readings, can improve cholesterol profiles, can decrease waist circumference, and can contribute to weight loss.    If after one month of 12 hour intermittent fasting, you feel comfortable increasing the hours, you may go for 14 hours, and even up to 16 hours. For example, stopped eating at 8 PM and don't eat again until noon the next day, breaking the fast at lunchtime. Snacking during the period of fast will break the fast and will not result in the same outcomes.    It is not recommended that you go beyond 16 hours of fasting.          Relevant Orders    POCT glycosylated hemoglobin (Hb A1C) manually resulted (Completed)    Coronary artery disease involving native coronary artery of native heart without angina pectoris I25.10     CCM, see hyperlipidemia A/P section.         Ischemic cardiomyopathy I25.5     Stable, follows with cardiology, last saw Dr. Aurelio Delatorre on 6/7/2024  Improved on follow-up echocardiogram.  Currently maintained on Farxiga, lisinopril, and metoprolol  CCM as per cardio.          Blood in urine,  abdominal/pelvic discomfort -  negative urine culture,  suspect stone passed     If blood resumes,    order for CT abd/pelvis is in but patient to call and I will change to stat order       Frequency of urination and glucose in urine -  almost certainly due to farxiga     Weight gain -     patient to  resume healthy eating and gradually increase exercise -   anticipate this plus farxiga will result in weight loss over time     Await thyroid lab added on from last Thursday      Follow-up in 6 months for routine care.  Call for sooner follow-up if needed.

## 2024-07-29 NOTE — PROGRESS NOTES
Subjective   Patient ID: Ofelia Morton is a 64 y.o. female who presents for Blood Sugar Problem (Io A1c ).    HPI     Patient presents today for routine follow-up.    Patient concerns:    ROUTINE VISIT  CHRONIC CONDITIONS:     Glucosuria  7/2024 Hgb A1c 5.8    Hypothyroidism  On Synthroid therapy managed by PCP  11/2023 TSH normal    HTN  Current regimen:  Lisinopril 5 mg daily  Metoprolol 25 mg daily (s/p MI)    HLD/CAD/Hx of NSTEMI  S/p LAD stent 11/2023  Presently on Brilinta  Follows with cardiology.    Per last visit with cardiologist Dr. Aurelio Delatorre on 6/7/2024:  She is tolerating Brilinta without significant side effects. Plan to continue current dose through November of this year. Likely change to Plavix or low-dose Brilinta next office visit     Ischemic cardiomyopathy  Follows with cardiology, last saw Dr. Aurelio Delatorre on 6/7/2024  Improved on follow-up echocardiogram.  Currently maintained on Farxiga, lisinopril, and metoprolol    Review of Systems   All other systems reviewed and are negative.      Objective   /68   Pulse 98   Temp 36 °C (96.8 °F)   Wt 82.5 kg (181 lb 14.4 oz)   SpO2 98%   BMI 28.92 kg/m²     Physical Exam  Vitals and nursing note reviewed.   Constitutional:       General: She is not in acute distress.     Appearance: Normal appearance. She is not toxic-appearing.   HENT:      Head: Normocephalic and atraumatic.   Eyes:      Extraocular Movements: Extraocular movements intact.      Pupils: Pupils are equal, round, and reactive to light.   Neck:      Thyroid: No thyromegaly.      Vascular: No hepatojugular reflux or JVD.   Cardiovascular:      Rate and Rhythm: Normal rate and regular rhythm.      Heart sounds: No murmur heard.     No friction rub. No gallop.   Pulmonary:      Effort: Pulmonary effort is normal.      Breath sounds: Normal breath sounds. No wheezing, rhonchi or rales.   Abdominal:      General: Bowel sounds are normal. There is no distension.      Palpations:  Abdomen is soft. There is no mass.      Tenderness: There is no abdominal tenderness. There is no guarding.   Musculoskeletal:      Right lower leg: No edema.      Left lower leg: No edema.   Lymphadenopathy:      Cervical: No cervical adenopathy.   Skin:     General: Skin is warm and dry.   Neurological:      General: No focal deficit present.      Mental Status: She is alert and oriented to person, place, and time.      Gait: Gait normal.   Psychiatric:         Mood and Affect: Mood normal.         Behavior: Behavior normal.         Assessment/Plan   Problem List Items Addressed This Visit             ICD-10-CM    Primary hypertension - Primary I10     BP under good control today, continue present regimen.         Hypothyroidism E03.9     On Synthroid therapy managed by PCP  Last TFTs: 11/2023 TSH normal    Due for repeat TSH, will add on to recently drawn blood work.  Continue present regimen, results to publish to portal, will notify patient if dose change is indicated.         Relevant Orders    TSH with reflex to Free T4 if abnormal    Other hyperlipidemia E78.49     S/p LAD stent 11/2023, taking rosuvastatin 40 mg daily  Presently on Brilinta  Follows with cardiology.    Per last visit with cardiologist Dr. Aurelio Delatorre on 6/7/2024:  She is tolerating Brilinta without significant side effects. Plan to continue current dose through November of this year. Likely change to Plavix or low-dose Brilinta next office visit     Asymptomatic from cardiac standpoint.  Continue statin therapy  Continue other cardiac medications as per cardio.    To lower this with lifestyle measures:  -make sure you are avoiding refined carbs such as breads, pasta, cereal, candy, soda,  nutrition bars, granola, chips, and sugar sweetened beverages.      -eat 5- 7 servings daily of veggies,  healthy protein such as chicken, fish,  beans, and eggs, and include healthy fats in your diet such as seeds, nuts, olive oil, avocados, and salmon.    -exercise 4 - 6 days per week as you are able, 150 minutes total weekly divided up is recommended with 3-4 of those days including resistance/strength training.  -consider taking the fiber product psyllium capsules or powder 2 times daily (generic brand is fine) , or a brand name psyllium such as [employer] [facility] [employer] or Metamucil.  -consider also adding plant stanols and sterols such as Nature Made CholestOff, available over the counter.          Glucose found in urine on examination (Chronic) R81     Recent UA noted 30+ glucose, IO A1c 5.8 today (prediabetic range)  Diet and exercise recommendations revisited.     INTERMITTENT FASTING  You may want to consider intermittent fasting.    There are several different approaches to this, but a straightforward and fairly easy one is to refrain from eating any calories for 12 hours every day. For example, from 8 PM until 8 AM, have nothing but water, or black coffee or tea in the morning. No cream or sugar should be used if you have coffee or tea. Make sure you have adequate fluid intake during this time, and throughout the day. This. A fast demands that your body pull upon stores of visceral fat for energy. This can help improve sugar readings, can improve cholesterol profiles, can decrease waist circumference, and can contribute to weight loss.    If after one month of 12 hour intermittent fasting, you feel comfortable increasing the hours, you may go for 14 hours, and even up to 16 hours. For example, stopped eating at 8 PM and don't eat again until noon the next day, breaking the fast at lunchtime. Snacking during the period of fast will break the fast and will not result in the same outcomes.    It is not recommended that you go beyond 16 hours of fasting.          Relevant Orders    POCT glycosylated hemoglobin (Hb A1C) manually resulted (Completed)    Coronary artery disease involving native coronary artery of native heart without angina pectoris I25.10     CCM, see  hyperlipidemia A/P section.         Ischemic cardiomyopathy I25.5     Stable, follows with cardiology, last saw Dr. Aurelio Delatorre on 6/7/2024  Improved on follow-up echocardiogram.  Currently maintained on Farxiga, lisinopril, and metoprolol  CCM as per cardio.          Blood in urine,  abdominal/pelvic discomfort -  negative urine culture,  suspect stone passed     If blood resumes,    order for CT abd/pelvis is in but patient to call and I will change to stat order       Frequency of urination and glucose in urine -  almost certainly due to farxiga     Weight gain -     patient to resume healthy eating and gradually increase exercise -   anticipate this plus farxiga will result in weight loss over time     Await thyroid lab added on from last Thursday    Follow-up in 6 months for routine care.  Call for sooner follow-up if needed.         Scribe Attestation  By signing my name below, I, Renee Parrish, Shyla   attest that this documentation has been prepared under the direction and in the presence of Sally Marley DO.

## 2025-01-15 ENCOUNTER — OFFICE VISIT (OUTPATIENT)
Dept: PRIMARY CARE | Facility: CLINIC | Age: 65
End: 2025-01-15
Payer: COMMERCIAL

## 2025-01-15 VITALS
RESPIRATION RATE: 14 BRPM | HEART RATE: 107 BPM | WEIGHT: 182.6 LBS | BODY MASS INDEX: 29.03 KG/M2 | DIASTOLIC BLOOD PRESSURE: 78 MMHG | TEMPERATURE: 98.4 F | OXYGEN SATURATION: 94 % | SYSTOLIC BLOOD PRESSURE: 119 MMHG

## 2025-01-15 DIAGNOSIS — J98.01 BRONCHOSPASM: ICD-10-CM

## 2025-01-15 DIAGNOSIS — J40 BRONCHITIS: Primary | ICD-10-CM

## 2025-01-15 PROCEDURE — 3078F DIAST BP <80 MM HG: CPT | Performed by: FAMILY MEDICINE

## 2025-01-15 PROCEDURE — 3074F SYST BP LT 130 MM HG: CPT | Performed by: FAMILY MEDICINE

## 2025-01-15 PROCEDURE — 99214 OFFICE O/P EST MOD 30 MIN: CPT | Performed by: FAMILY MEDICINE

## 2025-01-15 PROCEDURE — 1036F TOBACCO NON-USER: CPT | Performed by: FAMILY MEDICINE

## 2025-01-15 RX ORDER — ALBUTEROL SULFATE 90 UG/1
INHALANT RESPIRATORY (INHALATION)
Qty: 8.5 G | Refills: 0 | Status: SHIPPED | OUTPATIENT
Start: 2025-01-15

## 2025-01-15 RX ORDER — BENZONATATE 100 MG/1
100 CAPSULE ORAL 3 TIMES DAILY PRN
Qty: 30 CAPSULE | Refills: 0 | Status: SHIPPED | OUTPATIENT
Start: 2025-01-15 | End: 2025-02-14

## 2025-01-15 RX ORDER — AMOXICILLIN 875 MG/1
875 TABLET, FILM COATED ORAL 2 TIMES DAILY
Qty: 20 TABLET | Refills: 0 | Status: SHIPPED | OUTPATIENT
Start: 2025-01-15 | End: 2025-01-25

## 2025-01-15 RX ORDER — CLOPIDOGREL BISULFATE 75 MG/1
75 TABLET ORAL
COMMUNITY
Start: 2024-12-10

## 2025-01-15 NOTE — PROGRESS NOTES
Subjective   Patient ID: Ofelia Morton is a 64 y.o. female who presents for Cough (Pt presents for painful cough and sore throat, sore throat gone, low abdomen soreness from coughing, chest feels tight, no appetite, initially had fever and chills, lost 5-7 pounds X2 weeks. Pt took Choracedin for first few days./Pt declined flu vaccine.).  HPI  Sxs as in nurse note.     Wt loss   5 lbs  .   But  clothes tighter .  Worries about fluid retention  in abdomen . Not sure why .  No puffiness in feet , ankles/ abdomen. Has not had fluid retention in the past.       Last night vomited when coughing- a lot of phlegm  came up. And since sxs were not resolved over the last 2 wks, made appt.       Non smoker ,  no hx asthma.    Took Coricidin for the first 2 days . No other tx     Dtr-  ill  , and was exposed to her at [place] time.     Review of Systems  Blood tinged phlegm last week .   Hoarse for a couple days  Chest tightness  No chest pain .   No lightheadedness    Objective   /78 (BP Location: Right arm, Patient Position: Sitting, BP Cuff Size: Large adult)   Pulse 107   Temp 36.9 °C (98.4 °F) (Temporal)   Resp 14   Wt 82.8 kg (182 lb 9.6 oz)   SpO2 94%   BMI 29.03 kg/m²     Physical Exam  Vitals reviewed.   Constitutional:       General: She is not in acute distress.     Appearance: She is ill-appearing. She is not toxic-appearing.   HENT:      Head: Normocephalic and atraumatic.      Right Ear: Tympanic membrane normal.      Left Ear: Tympanic membrane normal.      Nose: Congestion present.      Mouth/Throat:      Pharynx: Posterior oropharyngeal erythema present. No oropharyngeal exudate.   Eyes:      Extraocular Movements: Extraocular movements intact.      Pupils: Pupils are equal, round, and reactive to light.   Cardiovascular:      Rate and Rhythm: Tachycardia present.   Pulmonary:      Effort: Pulmonary effort is normal.      Breath sounds: Wheezing present. No rhonchi or rales.   Chest:      Chest  wall: No tenderness.   Abdominal:      General: Abdomen is flat. Bowel sounds are normal.      Palpations: Abdomen is soft.      Comments: No abdominal distention or edema.    Musculoskeletal:      Right lower leg: No edema.      Left lower leg: No edema.   Lymphadenopathy:      Cervical: Cervical adenopathy present.   Neurological:      Mental Status: She is alert.         Assessment/Plan   Problem List Items Addressed This Visit    None  Visit Diagnoses       Bronchitis    -  Primary    Relevant Medications    amoxicillin (Amoxil) 875 mg tablet    albuterol (ProAir HFA) 90 mcg/actuation inhaler    benzonatate (Tessalon) 100 mg capsule    Bronchospasm        Relevant Medications    amoxicillin (Amoxil) 875 mg tablet    albuterol (ProAir HFA) 90 mcg/actuation inhaler    benzonatate (Tessalon) 100 mg capsule             Notify if not improved over next 24 hrs.  Can add steroid and plan for followup     HOLLY Araiza MD

## 2025-01-16 DIAGNOSIS — E03.9 HYPOTHYROIDISM, UNSPECIFIED TYPE: ICD-10-CM

## 2025-01-16 RX ORDER — LEVOTHYROXINE SODIUM 112 UG/1
112 TABLET ORAL DAILY
Qty: 90 TABLET | Refills: 3 | Status: SHIPPED | OUTPATIENT
Start: 2025-01-16

## 2025-01-21 ENCOUNTER — APPOINTMENT (OUTPATIENT)
Dept: PRIMARY CARE | Facility: CLINIC | Age: 65
End: 2025-01-21
Payer: COMMERCIAL